# Patient Record
Sex: FEMALE | Race: AMERICAN INDIAN OR ALASKA NATIVE | NOT HISPANIC OR LATINO | Employment: UNEMPLOYED | URBAN - METROPOLITAN AREA
[De-identification: names, ages, dates, MRNs, and addresses within clinical notes are randomized per-mention and may not be internally consistent; named-entity substitution may affect disease eponyms.]

---

## 2017-01-04 ENCOUNTER — HOSPITAL ENCOUNTER (OUTPATIENT)
Dept: RADIOLOGY | Facility: MEDICAL CENTER | Age: 47
End: 2017-01-04
Attending: PHYSICIAN ASSISTANT
Payer: COMMERCIAL

## 2017-01-04 DIAGNOSIS — N92.0 EXCESSIVE OR FREQUENT MENSTRUATION: ICD-10-CM

## 2017-01-04 PROCEDURE — 76830 TRANSVAGINAL US NON-OB: CPT

## 2017-01-05 ENCOUNTER — HOSPITAL ENCOUNTER (OUTPATIENT)
Dept: LAB | Facility: MEDICAL CENTER | Age: 47
End: 2017-01-05
Attending: SPECIALIST
Payer: COMMERCIAL

## 2017-01-05 PROCEDURE — 88175 CYTOPATH C/V AUTO FLUID REDO: CPT

## 2017-01-05 PROCEDURE — 87491 CHLMYD TRACH DNA AMP PROBE: CPT

## 2017-01-05 PROCEDURE — 87624 HPV HI-RISK TYP POOLED RSLT: CPT

## 2017-01-05 PROCEDURE — 88305 TISSUE EXAM BY PATHOLOGIST: CPT

## 2017-01-05 PROCEDURE — 87591 N.GONORRHOEAE DNA AMP PROB: CPT

## 2017-01-06 LAB — PATHOLOGY CONSULT NOTE: NORMAL

## 2017-01-07 LAB
C TRACH DNA GENITAL QL NAA+PROBE: NEGATIVE
CYTOLOGY REG CYTOL: NORMAL
HPV HR 12 DNA CVX QL NAA+PROBE: NEGATIVE
HPV16 DNA SPEC QL NAA+PROBE: NEGATIVE
HPV18 DNA SPEC QL NAA+PROBE: NEGATIVE
N GONORRHOEA DNA GENITAL QL NAA+PROBE: NEGATIVE
SPECIMEN SOURCE: NORMAL
SPECIMEN SOURCE: NORMAL

## 2017-03-29 ENCOUNTER — HOSPITAL ENCOUNTER (OUTPATIENT)
Facility: MEDICAL CENTER | Age: 47
End: 2017-03-29
Attending: SPECIALIST | Admitting: SPECIALIST
Payer: COMMERCIAL

## 2017-04-05 NOTE — H&P
DATE OF SCHEDULED SURGERY:  2017    REASON FOR SURGERY:  Menometrorrhagia, dysmenorrhea, pelvic pain, symptomatic   pelvic floor relaxation, type 2 stress urinary incontinence.    HISTORY OF PRESENT ILLNESS:  This is a 47-year-old woman who was referred from   the [facility] after she had failed multiple oral contraceptive   regimen, continues to have menometrorrhagia and dysmenorrhea.  She states that   she is no longer interested in proceeding forward with any other conservative   hormonal management, other conservative surgical approach, is now interested   in proceeding forward with a diagnostic hysteroscopy, dilatation and   curettage, endometrial ablative procedure in addition to correction of her   symptomatic pelvic floor relaxation with an anterior and posterior vaginal   repair, enterocele repair, perineoplasty, transobturator tape midurethral   sling procedure and a probable sacrospinous wall suspension in addition to a   laparoscopic bilateral tubal fulguration procedure.  Risks, benefits,   alternatives of all individual procedures were addressed with the patient in   detail over several visits.  She has asked appropriate questions, signed the   appropriate consents and is wishing to proceed forward with surgery as   planned.    PAST MEDICAL HISTORY:  Migraine headaches, mixed urinary incontinence   symptoms, currently on oxybutynin for an overactive bladder component to her   urinary incontinence.    PAST SURGICAL HISTORY:  Nephrolithiasis, cholecystectomy.    OBSTETRICAL HISTORY:  Two previous deliveries in , , 1 therapeutic   .    GYNECOLOGIC HISTORY:  Patient reports completely irregular cycles with passage   of large clots, significant dysmenorrhea and pelvic pain.  She has a large   bulge at the vaginal introitus consistent with the leading edge of a   cystocele.  She has significant urinary incontinence with mixed urinary   incontinence component of systems.  She  denies any previous sexually   transmitted diseases or pelvic infections.    SOCIAL HISTORY:  She is .  She denies use of any alcohol, tobacco, or   recreational drug use.    MEDICATIONS:  Ibuprofen p.r.n., Maxalt 10 mg p.o. q. day.    ALLERGIES:  No known drug allergies.    PHYSICAL EXAMINATION:  VITAL SIGNS:  She is afebrile, hemodynamically stable.  HEART:  Regular rate and rhythm.  CHEST:  Clear to auscultation bilaterally.  ABDOMEN:  Soft, obese, nontender.  PELVIC:  Shows grade II anterior and posterior apical vaginal relaxation with   evidence of an enterocele on rectovaginal examination.  She is guaiac   negative.  EXTREMITIES:  Nontender.    She has undergone a workup including a negative urine pregnancy test.    Ultrasound demonstrated an inhomogeneous appearance of uterus in the   submucosal area, possibly representing a submucosal uterine leiomyoma versus   adenomyosis.  She has got ill-defined endometrium, although not abnormally   thickened.  She has undergone an endometrial biopsy, which demonstrated no   evidence of hyperplasia or malignancy.  Her Pap smear was within normal   limits.  Urodynamic studies did confirm type 2 stress urinary incontinence   with mixed urinary incontinence component on symptomatology.    ASSESSMENT AND PLAN:  A 47-year-old woman with menometrorrhagia, dysmenorrhea,   and pelvic pain, possibly related to symptomatic uterine fibroids versus   adenomyosis, failed oral contraceptive therapy.  Patient is currently   interested in proceeding forward with an endometrial ablative procedure with a   laparoscopic bilateral tubal fulguration; however, she has been extensively   counseled and is also considering proceeding forward with a hysterectomy   should any pathology be evaluated at the time of surgery in the form of a   laparoscopic-assisted vaginal hysterectomy and bilateral salpingectomy.  She   also wishes to proceed forward with correction of her symptomatic pelvic  floor   relaxation and anterior and posterior vaginal repair, enterocele repair,   perineoplasty, transobturator tape mid urethral sling procedure for possible   sacrospinous vault suspension.  Risks, benefits, alternatives of all   individual procedures have been addressed with the patient in detail over   several visits.  She is interested in proceeding forward with surgery as   planned.  She does understand that her pelvic pain and overactive bladder   symptoms may actually be unimproved her worsen with surgery as described   above; however, is interested in proceeding forward with the surgery    nonetheless.       ____________________________________     MD ELEAZAR ROJAS / JIN    DD:  04/05/2017 09:20:33  DT:  04/05/2017 09:41:21    D#:  [id number]  Job#:  [id number]

## 2019-10-17 ENCOUNTER — HOSPITAL ENCOUNTER (OUTPATIENT)
Dept: RADIOLOGY | Facility: MEDICAL CENTER | Age: 49
End: 2019-10-17
Attending: INTERNAL MEDICINE
Payer: COMMERCIAL

## 2019-10-17 DIAGNOSIS — S10.93XA CONTUSION OF FACE, SCALP AND NECK, INITIAL ENCOUNTER: ICD-10-CM

## 2019-10-17 DIAGNOSIS — S00.83XA CONTUSION OF FACE, SCALP AND NECK, INITIAL ENCOUNTER: ICD-10-CM

## 2019-10-17 DIAGNOSIS — S00.03XA CONTUSION OF FACE, SCALP AND NECK, INITIAL ENCOUNTER: ICD-10-CM

## 2019-10-17 PROCEDURE — 70450 CT HEAD/BRAIN W/O DYE: CPT

## 2020-01-22 ENCOUNTER — OFFICE VISIT (OUTPATIENT)
Dept: URGENT CARE | Facility: PHYSICIAN GROUP | Age: 50
End: 2020-01-22
Payer: COMMERCIAL

## 2020-01-22 VITALS
HEIGHT: 65 IN | OXYGEN SATURATION: 95 % | BODY MASS INDEX: 36.82 KG/M2 | RESPIRATION RATE: 15 BRPM | DIASTOLIC BLOOD PRESSURE: 90 MMHG | WEIGHT: 221 LBS | HEART RATE: 77 BPM | TEMPERATURE: 98.4 F | SYSTOLIC BLOOD PRESSURE: 144 MMHG

## 2020-01-22 DIAGNOSIS — H69.93 EUSTACHIAN TUBE DYSFUNCTION, BILATERAL: ICD-10-CM

## 2020-01-22 DIAGNOSIS — R42 VERTIGO: ICD-10-CM

## 2020-01-22 PROCEDURE — 99203 OFFICE O/P NEW LOW 30 MIN: CPT | Performed by: PHYSICIAN ASSISTANT

## 2020-01-22 RX ORDER — ALUMINUM HYDROXIDE, MAGNESIUM HYDROXIDE, SIMETHICONE 400; 400; 40 MG/10ML; MG/10ML; MG/10ML
SUSPENSION ORAL
COMMUNITY
Start: 2020-01-17 | End: 2023-04-06

## 2020-01-22 RX ORDER — AMOXICILLIN AND CLAVULANATE POTASSIUM 875; 125 MG/1; MG/1
1 TABLET, FILM COATED ORAL 2 TIMES DAILY
Qty: 14 TAB | Refills: 0 | Status: SHIPPED | OUTPATIENT
Start: 2020-01-22 | End: 2020-01-29

## 2020-01-22 RX ORDER — MECLIZINE HYDROCHLORIDE 25 MG/1
25 TABLET ORAL 3 TIMES DAILY PRN
COMMUNITY
End: 2020-01-22

## 2020-01-22 RX ORDER — CLOBETASOL PROPIONATE 0.5 MG/G
CREAM TOPICAL
COMMUNITY
Start: 2019-12-23

## 2020-01-22 RX ORDER — AZITHROMYCIN 250 MG/1
TABLET, FILM COATED ORAL
COMMUNITY
Start: 2020-01-21 | End: 2023-04-06

## 2020-01-22 RX ORDER — HYDROCORTISONE 1 G/100G
CREAM TOPICAL
COMMUNITY
Start: 2020-01-17 | End: 2023-04-06

## 2020-01-22 RX ORDER — IBUPROFEN 800 MG/1
TABLET ORAL
COMMUNITY
Start: 2019-12-23

## 2020-01-22 RX ORDER — PREDNISONE 10 MG/1
10 TABLET ORAL DAILY
COMMUNITY
End: 2023-04-06

## 2020-01-22 RX ORDER — SODIUM CHLORIDE 0.65 %
AEROSOL, SPRAY (ML) NASAL
COMMUNITY
Start: 2019-11-04 | End: 2023-04-06

## 2020-01-22 RX ORDER — DEXTROMETHORPHAN HBR, GUAIFENESIN 20; 200 MG/10ML; MG/10ML
SOLUTION ORAL
COMMUNITY
Start: 2020-01-17 | End: 2023-04-06

## 2020-01-22 RX ORDER — MECLIZINE HCL 25 MG/1
TABLET, CHEWABLE ORAL
COMMUNITY
Start: 2020-01-21

## 2020-01-22 RX ORDER — KETOTIFEN FUMARATE 0.25 MG/ML
SOLUTION/ DROPS OPHTHALMIC
COMMUNITY
Start: 2019-12-23

## 2020-01-22 ASSESSMENT — ENCOUNTER SYMPTOMS
VOMITING: 0
SHORTNESS OF BREATH: 0
CHILLS: 0
COUGH: 0
DIZZINESS: 1
SPUTUM PRODUCTION: 0
EYE DISCHARGE: 0
SORE THROAT: 0
PALPITATIONS: 0
MYALGIAS: 0
EYE REDNESS: 0
FEVER: 0
DIARRHEA: 0
ABDOMINAL PAIN: 0
NAUSEA: 0
HEADACHES: 1
WHEEZING: 0
CONSTIPATION: 0

## 2020-01-23 NOTE — PROGRESS NOTES
Subjective:      Radha Dawkins is a 49 y.o. female who presents with Dizziness (onset sunday, sick before that, now having ear and jaw pain)    HPI:  This is a new problem. Radha Dawkins is a 49 y.o. female who presents today for her to go.  Patient has been being evaluated by her PCP since Friday of last week.  She initially went in for complaints of cough and nasal congestion at that time had 1 episode of vertigo.  She was prescribed azithromycin as well as meclizine.  She also reports that her migraines have been acting up of the past few days.  She has a history of migraines and his headaches are presenting the same.  She normally takes Maxalt for the symptoms.  Last time she took a dose of Maxalt was 3 days ago.  She states she took a few days of the azithromycin and then stopped taking it she was not sure if it was can interact with any of her other medications.  She says that she still has intermittent migraines over the past few days, though she has not taken any Maxalt since then.  She still having symptoms of vertigo, however.  She came in to see if there is anything different we can do for the vertigo.  She denies any visual changes, lightheadedness, chest pain, shortness of breath.  At the last minute patient also notes that her PCP started her on prednisone yesterday.      Review of Systems   Constitutional: Positive for malaise/fatigue. Negative for chills and fever.   HENT: Positive for congestion. Negative for sore throat.         Plugged ear sensation bilaterally   Eyes: Negative for discharge and redness.   Respiratory: Negative for cough, sputum production, shortness of breath and wheezing.    Cardiovascular: Negative for chest pain and palpitations.   Gastrointestinal: Negative for abdominal pain, constipation, diarrhea, nausea and vomiting.   Musculoskeletal: Negative for myalgias.   Skin: Negative for rash.   Neurological: Positive for dizziness and headaches.       PMH:  has no past medical  "history on file.  MEDS:   Current Outpatient Medications:   •  meclizine (ANTIVERT) 25 MG Tab, Take 25 mg by mouth 3 times a day as needed., Disp: , Rfl:   •  Diclofenac Sodium 1 % Gel, , Disp: , Rfl:   •  azithromycin (ZITHROMAX) 250 MG Tab, , Disp: , Rfl:   •  PROAIR  (90 Base) MCG/ACT Aero Soln inhalation aerosol, , Disp: , Rfl:   •  ibuprofen (MOTRIN) 800 MG Tab, , Disp: , Rfl:   •  Rizatriptan Benzoate (MAXALT-MLT PO), Take  by mouth., Disp: , Rfl:   •  predniSONE (DELTASONE) 10 MG Tab, Take 10 mg by mouth every day., Disp: , Rfl:   •  amoxicillin-clavulanate (AUGMENTIN) 875-125 MG Tab, Take 1 Tab by mouth 2 times a day for 7 days., Disp: 14 Tab, Rfl: 0  ALLERGIES: Not on File  SURGHX: No past surgical history on file.  SOCHX:  reports that she has never smoked. She has never used smokeless tobacco.  FH: Family history was reviewed, no pertinent findings to report     Objective:     /90   Pulse 77   Temp 36.9 °C (98.4 °F)   Resp 15   Ht 1.651 m (5' 5\")   Wt 100.2 kg (221 lb)   SpO2 95%   BMI 36.78 kg/m²      Physical Exam  Constitutional:       Appearance: She is well-developed.   HENT:      Head: Normocephalic and atraumatic.      Right Ear: Ear canal and external ear normal. Tympanic membrane is erythematous and bulging.      Left Ear: Ear canal and external ear normal. Tympanic membrane is erythematous and bulging.      Nose: Mucosal edema and congestion present. No rhinorrhea.      Right Sinus: No maxillary sinus tenderness or frontal sinus tenderness.      Left Sinus: No maxillary sinus tenderness or frontal sinus tenderness.      Mouth/Throat:      Lips: Pink.      Mouth: Mucous membranes are moist.      Pharynx: Oropharynx is clear.   Eyes:      Conjunctiva/sclera: Conjunctivae normal.      Pupils: Pupils are equal, round, and reactive to light.   Neck:      Musculoskeletal: Normal range of motion.   Cardiovascular:      Rate and Rhythm: Normal rate and regular rhythm.      Heart " sounds: Normal heart sounds. No murmur.   Pulmonary:      Effort: Pulmonary effort is normal.      Breath sounds: Normal breath sounds. No wheezing.   Lymphadenopathy:      Cervical: No cervical adenopathy.   Skin:     General: Skin is warm and dry.      Capillary Refill: Capillary refill takes less than 2 seconds.   Neurological:      Mental Status: She is alert and oriented to person, place, and time.   Psychiatric:         Behavior: Behavior normal.         Judgment: Judgment normal.            Assessment/Plan:       1. Eustachian tube dysfunction, bilateral  - amoxicillin-clavulanate (AUGMENTIN) 875-125 MG Tab; Take 1 Tab by mouth 2 times a day for 7 days.  Dispense: 14 Tab; Refill: 0    2. Vertigo  -Patient was given information on the modified Epley maneuver.  She can continue her meclizine prescription as needed.  ER precautions discussed.      *Continue with other prescriptions as prescribed by PCP.  Follow-up with them if no resolution after 3 days.  Strict ER precautions discussed.

## 2020-06-19 ENCOUNTER — OFFICE VISIT (OUTPATIENT)
Dept: URGENT CARE | Facility: PHYSICIAN GROUP | Age: 50
End: 2020-06-19
Payer: COMMERCIAL

## 2020-06-19 ENCOUNTER — HOSPITAL ENCOUNTER (OUTPATIENT)
Dept: RADIOLOGY | Facility: MEDICAL CENTER | Age: 50
End: 2020-06-19
Attending: FAMILY MEDICINE
Payer: COMMERCIAL

## 2020-06-19 VITALS
SYSTOLIC BLOOD PRESSURE: 162 MMHG | HEIGHT: 65 IN | HEART RATE: 78 BPM | RESPIRATION RATE: 16 BRPM | BODY MASS INDEX: 38.65 KG/M2 | WEIGHT: 232 LBS | OXYGEN SATURATION: 98 % | DIASTOLIC BLOOD PRESSURE: 102 MMHG | TEMPERATURE: 97.1 F

## 2020-06-19 DIAGNOSIS — M79.605 LEFT LEG PAIN: ICD-10-CM

## 2020-06-19 DIAGNOSIS — S87.82XA LOWER LEG CRUSH INJURY, LEFT, INITIAL ENCOUNTER: ICD-10-CM

## 2020-06-19 DIAGNOSIS — R03.0 ELEVATED BLOOD PRESSURE READING: ICD-10-CM

## 2020-06-19 PROCEDURE — 73590 X-RAY EXAM OF LOWER LEG: CPT | Mod: LT

## 2020-06-19 PROCEDURE — 93971 EXTREMITY STUDY: CPT | Mod: LT

## 2020-06-19 PROCEDURE — 99204 OFFICE O/P NEW MOD 45 MIN: CPT | Performed by: FAMILY MEDICINE

## 2020-06-19 ASSESSMENT — ENCOUNTER SYMPTOMS
MUSCLE WEAKNESS: 0
FEVER: 0
SHORTNESS OF BREATH: 0
NUMBNESS: 1
COUGH: 0
MYALGIAS: 0
LOSS OF MOTION: 0
CHILLS: 0
SORE THROAT: 0
DIZZINESS: 0
VOMITING: 0
NAUSEA: 0
INABILITY TO BEAR WEIGHT: 0

## 2020-06-19 ASSESSMENT — PAIN SCALES - GENERAL: PAINLEVEL: 4=SLIGHT-MODERATE PAIN

## 2020-06-19 NOTE — PATIENT INSTRUCTIONS
Contusion  Introduction  A contusion is a deep bruise. Contusions happen when an injury causes bleeding under the skin. Symptoms of bruising include pain, swelling, and discolored skin. The skin may turn blue, purple, or yellow.  Follow these instructions at home:  · Rest the injured area.  · If told, put ice on the injured area.  ¨ Put ice in a plastic bag.  ¨ Place a towel between your skin and the bag.  ¨ Leave the ice on for 20 minutes, 2-3 times per day.  · If told, put light pressure (compression) on the injured area using an elastic bandage. Make sure the bandage is not too tight. Remove it and put it back on as told by your doctor.  · If possible, raise (elevate) the injured area above the level of your heart while you are sitting or lying down.  · Take over-the-counter and prescription medicines only as told by your doctor.  Contact a doctor if:  · Your symptoms do not get better after several days of treatment.  · Your symptoms get worse.  · You have trouble moving the injured area.  Get help right away if:  · You have very bad pain.  · You have a loss of feeling (numbness) in a hand or foot.  · Your hand or foot turns pale or cold.  This information is not intended to replace advice given to you by your health care provider. Make sure you discuss any questions you have with your health care provider.  Document Released: 06/05/2009 Document Revised: 05/25/2017 Document Reviewed: 05/04/2016  © 2017 Elsevier    Hypertension  Hypertension is another name for high blood pressure. High blood pressure forces your heart to work harder to pump blood. A blood pressure reading has two numbers, which includes a higher number over a lower number (example: 110/72).  Follow these instructions at home:  · Have your blood pressure rechecked by your doctor.  · Only take medicine as told by your doctor. Follow the directions carefully. The medicine does not work as well if you skip doses. Skipping doses also puts you at  risk for problems.  · Do not smoke.  · Monitor your blood pressure at home as told by your doctor.  Contact a doctor if:  · You think you are having a reaction to the medicine you are taking.  · You have repeat headaches or feel dizzy.  · You have puffiness (swelling) in your ankles.  · You have trouble with your vision.  Get help right away if:  · You get a very bad headache and are confused.  · You feel weak, numb, or faint.  · You get chest or belly (abdominal) pain.  · You throw up (vomit).  · You cannot breathe very well.  This information is not intended to replace advice given to you by your health care provider. Make sure you discuss any questions you have with your health care provider.  Document Released: 06/05/2009 Document Revised: 05/25/2017 Document Reviewed: 10/10/2014  [employer] Interactive Patient Education © 2017 [employer] Inc.
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
## 2020-07-11 ENCOUNTER — HOSPITAL ENCOUNTER (OUTPATIENT)
Dept: RADIOLOGY | Facility: MEDICAL CENTER | Age: 50
End: 2020-07-11
Attending: NURSE PRACTITIONER
Payer: COMMERCIAL

## 2020-07-11 ENCOUNTER — OFFICE VISIT (OUTPATIENT)
Dept: URGENT CARE | Facility: PHYSICIAN GROUP | Age: 50
End: 2020-07-11
Payer: COMMERCIAL

## 2020-07-11 VITALS
SYSTOLIC BLOOD PRESSURE: 162 MMHG | BODY MASS INDEX: 37.99 KG/M2 | RESPIRATION RATE: 16 BRPM | OXYGEN SATURATION: 98 % | WEIGHT: 228 LBS | DIASTOLIC BLOOD PRESSURE: 108 MMHG | HEIGHT: 65 IN | TEMPERATURE: 98 F | HEART RATE: 97 BPM

## 2020-07-11 DIAGNOSIS — M25.462 PAIN AND SWELLING OF LEFT KNEE: ICD-10-CM

## 2020-07-11 DIAGNOSIS — S89.92XA INJURY OF LEFT KNEE, INITIAL ENCOUNTER: ICD-10-CM

## 2020-07-11 DIAGNOSIS — M25.462 EFFUSION OF LEFT KNEE: ICD-10-CM

## 2020-07-11 DIAGNOSIS — M25.562 PAIN AND SWELLING OF LEFT KNEE: ICD-10-CM

## 2020-07-11 PROCEDURE — 99214 OFFICE O/P EST MOD 30 MIN: CPT | Performed by: NURSE PRACTITIONER

## 2020-07-11 PROCEDURE — 73562 X-RAY EXAM OF KNEE 3: CPT | Mod: LT

## 2020-07-11 RX ORDER — METHYLPREDNISOLONE 4 MG/1
TABLET ORAL
Qty: 1 PACKAGE | Refills: 0 | Status: SHIPPED | OUTPATIENT
Start: 2020-07-11 | End: 2023-04-06

## 2020-07-11 ASSESSMENT — ENCOUNTER SYMPTOMS
SENSORY CHANGE: 0
NEUROLOGICAL NEGATIVE: 1
WEAKNESS: 0
CONSTITUTIONAL NEGATIVE: 1

## 2020-07-11 ASSESSMENT — VISUAL ACUITY: OU: 1

## 2020-07-11 NOTE — PROGRESS NOTES
Subjective:     Radha Dawkins is a 50 y.o. female who presents for Knee Pain (L knee pain, swelling, came to  x2 weeks ago)       Knee Pain   This is a new problem. The problem has been unchanged. Pertinent negatives include no weakness.     Patient presents to urgent care today with reports of left knee swelling and medial tenderness.  Patient was seen in urgent care on 6/19/2020 for evaluation of a left lower leg injury after striking the front of her left lower leg against a rock while she was biking about 1 month prior to that.  An x-ray of her left lower leg and an ultrasound came back negative for acute process.  Patient has been using ibuprofen.  Reports that her left lower leg bruising has been improving.  However, her left knee swelling has not been improving.    Patient was screened prior to rooming and denied COVID-19 diagnosis or contact with a person who has been diagnosed or is suspected to have COVID-19. During this visit, appropriate PPE was worn, hand hygiene was performed, and the patient and any visitors were masked.     PMH:  has no past medical history on file.    MEDS:   Current Outpatient Medications:   •  methylPREDNISolone (MEDROL DOSEPAK) 4 MG Tablet Therapy Pack, Use as directed on package. May take all of Day 1 as a single dose (24 mg) when starting., Disp: 1 Package, Rfl: 0  •  Diclofenac Sodium 1 % Gel, , Disp: , Rfl:   •  azithromycin (ZITHROMAX) 250 MG Tab, , Disp: , Rfl:   •  ibuprofen (MOTRIN) 800 MG Tab, , Disp: , Rfl:   •  Rizatriptan Benzoate (MAXALT-MLT PO), Take  by mouth., Disp: , Rfl:   •  predniSONE (DELTASONE) 10 MG Tab, Take 10 mg by mouth every day., Disp: , Rfl:   •  ALMACONE 200-200-20 MG/5ML Suspension, , Disp: , Rfl:   •  DEEP SEA NASAL SPRAY 0.65 % Solution, , Disp: , Rfl:   •  ketotifen (ZADITOR) 0.025 % ophthalmic solution, , Disp: , Rfl:   •  TRAVEL SICKNESS 25 MG Chew Tab, , Disp: , Rfl:   •  clobetasol (TEMOVATE) 0.05 % Cream, , Disp: , Rfl:   •  tretinoin  "(RETIN-A) 0.025 % cream, , Disp: , Rfl:   •  ROBAFEN DM COUGH  MG/5ML Liquid soln, , Disp: , Rfl:   •  SORE THROAT SPRAY 1.4 % Liquid, , Disp: , Rfl:     ALLERGIES: No Known Allergies    SURGHX: History reviewed. No pertinent surgical history.    SOCHX:  reports that she has never smoked. She has never used smokeless tobacco. She reports previous alcohol use.     FH: Reviewed with patient, not pertinent to this visit.    Review of Systems   Constitutional: Negative.    Musculoskeletal:        Left knee pain, swelling   Skin: Negative.    Neurological: Negative.  Negative for sensory change and weakness.   All other systems reviewed and are negative.    Additional details per HPI.      Objective:     BP (!) 162/108   Pulse 97   Temp 36.7 °C (98 °F) (Temporal)   Resp 16   Ht 1.651 m (5' 5\")   Wt 103.4 kg (228 lb)   SpO2 98%   BMI 37.94 kg/m²     Physical Exam  Vitals signs reviewed.   Constitutional:       General: She is not in acute distress.     Appearance: She is well-developed. She is not ill-appearing or toxic-appearing.   HENT:      Head: Normocephalic.      Right Ear: External ear normal.      Left Ear: External ear normal.      Nose: Nose normal.   Eyes:      General: Vision grossly intact.      Extraocular Movements: Extraocular movements intact.      Conjunctiva/sclera: Conjunctivae normal.   Neck:      Musculoskeletal: Normal range of motion.   Cardiovascular:      Rate and Rhythm: Normal rate.   Pulmonary:      Effort: Pulmonary effort is normal. No respiratory distress.   Musculoskeletal: Normal range of motion.         General: No deformity.      Left knee: She exhibits swelling. She exhibits normal range of motion, no ecchymosis, no deformity, no laceration, normal alignment, no LCL laxity, normal patellar mobility and no MCL laxity. Tenderness found. Medial joint line tenderness noted.      Comments: Negative drawer tests   Skin:     General: Skin is warm and dry.      Coloration: Skin is " not pale.   Neurological:      Mental Status: She is alert and oriented to person, place, and time.      Sensory: No sensory deficit.      Motor: No weakness.      Coordination: Coordination normal.      Gait: Gait normal.   Psychiatric:         Behavior: Behavior normal. Behavior is cooperative.     X-ray of left knee:    Details     Reading Physician  Reading Date  Result Priority    Devin Calixto M.D.  [phone]  7/11/2020        Narrative & Impression         7/11/2020 1:14 PM     HISTORY/REASON FOR EXAM:  The left knee pain. Injured 2 weeks ago.     TECHNIQUE/EXAM DESCRIPTION AND NUMBER OF VIEWS: 3 views of the LEFT knee.     COMPARISON: None     FINDINGS:  Bone density is normal. There is no evidence of fracture or dislocation. There is no evidence of arthropathy. There is a moderate sized joint effusion.     IMPRESSION:     No evidence of acute fracture or dislocation.             Last Resulted: 07/11/20  1:19 PM           Assessment/Plan:     1. Effusion of left knee    2. Pain and swelling of left knee  - DX-KNEE 3 VIEWS LEFT; Future  - methylPREDNISolone (MEDROL DOSEPAK) 4 MG Tablet Therapy Pack; Use as directed on package. May take all of Day 1 as a single dose (24 mg) when starting.  Dispense: 1 Package; Refill: 0  - REFERRAL TO SPORTS MEDICINE    3. Injury of left knee, initial encounter      X-ray of left knee ordered. Radiology report and images reviewed by myself. Moderate joint effusion.    Rest, ice, compress, elevate, acetaminophen. Elastic bandage applied. Advised to avoid NSAIDs while on steroid therapy. Sports medicine evaluation and treatment.    Differential diagnosis, natural history, supportive care, over-the-counter symptom management per 's instructions, close monitoring, and indications for immediate follow-up discussed.     Patient advised to: Return for 1) Symptoms that worsen/don't improve, or go to ER, 2) Follow up with primary care in 7-10 days.    All questions  answered. Patient agrees with the plan of care.    Discharge summary provided.

## 2020-07-11 NOTE — PATIENT INSTRUCTIONS
A referral request has been placed for sports medicine. We have a referrals department that is tasked with locating a suitable office that currently accepts patients and your insurance and you should be receiving referral information from them such as the office name, address, and number. This process usually takes around 3 business days. If you are not contacted by our referrals department, please call [phone].       Rest, ice, compress, elevate, acetaminophen.      Knee Effusion  Knee effusion refers to excess fluid in the knee joint. This can cause pain and swelling in your knee. Knee effusion creates more pressure than usual in your knee joint. This makes it more difficult for you to bend and move your knee. If there is fluid in your knee, it often means that something is wrong inside your knee. This can be a result of:  · Severe arthritis.  · Injury to the knee muscles, ligaments, or cartilage.  · Infection.  · Autoimmune disease. This means that your body's defense system (immune system) mistakenly attacks healthy body tissues.  Follow these instructions at home:  Medicines  · Take over-the-counter and prescription medicines only as told by your health care provider.  · Do not drive or use heavy machinery while taking prescription pain medicine.  · If you are taking prescription pain medicine, take actions to prevent or treat constipation. Your health care provider may recommend that you:  ? Drink enough fluid to keep your urine pale yellow.  ? Eat foods that are high in fiber, such as fresh fruits and vegetables, whole grains, and beans.  ? Limit foods that are high in fat and processed sugars, such as fried or sweet foods.  ? Take an over-the-counter or prescription medicine for constipation.  If you have a brace:  · Wear the brace as told by your health care provider. Remove it only as told by your health care provider.  · Loosen the brace if your toes tingle, become numb, or turn cold and  blue.  · Keep the brace clean.  · If the brace is not waterproof:  ? Do not let it get wet.  ? Cover it with a watertight covering when you take a bath or a shower.  Managing pain, stiffness, and swelling    · If directed, put ice on the swollen area:  ? If you have a removable brace, remove it as told by your health care provider.  ? Put ice in a plastic bag.  ? Place a towel between your skin and the bag.  ? Leave the ice on for 20 minutes, 2-3 times per day.  · Raise (elevate) your knee at or above the level of your heart while you are sitting or lying down.  General instructions  · Do not use any products that contain nicotine or tobacco, such as cigarettes and e-cigarettes. These can delay healing. If you need help quitting, ask your health care provider.  · Do not use the injured limb to support your body weight until your health care provider says it is okay. Use crutches as told by your health care provider.  · Do any rehabilitation or strengthening exercises as told by your health care provider.  · Rest as told by your health care provider.  ? Avoid sitting for a long time without moving. Get up to take short walks every 1-2 hours. This is important to improve blood flow and breathing. Ask for help if you feel weak or unsteady.  · Keep all follow-up visits as told by your health care provider. This is important.  Contact a health care provider if you:  · Continue to have pain in your knee.  Get help right away if you:  · Have swelling or redness of your knee that gets worse or does not get better.  · Have severe pain in your knee.  · Have a fever.  Summary  · Knee effusion refers to excess fluid in the knee joint. This causes pain and swelling and makes it difficult to bend and move your knee.  · Effusion may be caused by severe arthritis, autoimmune disease, infection, or injury to the knee muscles, ligaments, or cartilage.  · Take over-the-counter and prescription medicines only as told by your health  care provider.  · If you have a brace, wear the brace as told by your health care provider.  This information is not intended to replace advice given to you by your health care provider. Make sure you discuss any questions you have with your health care provider.  Document Released: 03/09/2005 Document Revised: 09/06/2019 Document Reviewed: 12/30/2018  Elsevier Patient Education © 2020 [employer]

## 2021-02-23 ENCOUNTER — APPOINTMENT (OUTPATIENT)
Dept: RADIOLOGY | Facility: MEDICAL CENTER | Age: 51
End: 2021-02-23
Attending: UROLOGY
Payer: COMMERCIAL

## 2021-03-04 ENCOUNTER — HOSPITAL ENCOUNTER (OUTPATIENT)
Dept: RADIOLOGY | Facility: MEDICAL CENTER | Age: 51
End: 2021-03-04
Attending: UROLOGY
Payer: COMMERCIAL

## 2021-03-04 DIAGNOSIS — N20.1 CALCULUS OF URETER: ICD-10-CM

## 2021-03-04 PROCEDURE — 74018 RADEX ABDOMEN 1 VIEW: CPT

## 2021-03-04 PROCEDURE — 76775 US EXAM ABDO BACK WALL LIM: CPT

## 2022-08-31 ENCOUNTER — HOSPITAL ENCOUNTER (OUTPATIENT)
Dept: RADIOLOGY | Facility: MEDICAL CENTER | Age: 52
End: 2022-08-31
Attending: INTERNAL MEDICINE
Payer: COMMERCIAL

## 2022-08-31 DIAGNOSIS — Z12.31 ENCOUNTER FOR SCREENING MAMMOGRAM FOR MALIGNANT NEOPLASM OF BREAST: ICD-10-CM

## 2022-08-31 PROCEDURE — 77063 BREAST TOMOSYNTHESIS BI: CPT

## 2023-04-06 ENCOUNTER — OFFICE VISIT (OUTPATIENT)
Dept: URGENT CARE | Facility: PHYSICIAN GROUP | Age: 53
End: 2023-04-06

## 2023-04-06 VITALS
WEIGHT: 220 LBS | DIASTOLIC BLOOD PRESSURE: 84 MMHG | OXYGEN SATURATION: 98 % | BODY MASS INDEX: 36.65 KG/M2 | HEART RATE: 70 BPM | HEIGHT: 65 IN | RESPIRATION RATE: 12 BRPM | TEMPERATURE: 97.8 F | SYSTOLIC BLOOD PRESSURE: 132 MMHG

## 2023-04-06 DIAGNOSIS — R21 RASH: ICD-10-CM

## 2023-04-06 PROCEDURE — 99213 OFFICE O/P EST LOW 20 MIN: CPT | Performed by: REGISTERED NURSE

## 2023-04-06 RX ORDER — TRIAMCINOLONE ACETONIDE 1 MG/G
1 OINTMENT TOPICAL 2 TIMES DAILY
Qty: 30 G | Refills: 1 | Status: SHIPPED | OUTPATIENT
Start: 2023-04-06 | End: 2023-04-13

## 2023-04-06 NOTE — PROGRESS NOTES
"Chief Complaint   Patient presents with    Bump     Getting \"welts\" on arms, started on R arm x1 week ago and went away; now on L arm. One was on R food but it also went away. They itch.      HPI:   Patient is a 53 y.o. female who is presenting for a few red itchy bumps that have been appeared on the right arm then resolved and a few days ago developed 2 on the left forearm. The ones on right arm were present when she woke up, are itchy and then resolved after 6-7 days.  Same course with the 2 on left forearm.  Reports using Benadryl which did help with the itching.  Does take a daily Allegra.  Denies underlying skin conditions or environmental or food allergies.  No new medications or change in daily activities.  No in household has similar symptoms.  Pertinent negatives include no chest pain, shortness of breath, throat swelling, recent illness, fever    No Known Allergies     Current Outpatient Medications Ordered in Epic   Medication Sig Dispense Refill    Fexofenadine-Pseudoephedrine (ALLEGRA-D 12 HOUR PO) Take  by mouth.      triamcinolone acetonide (KENALOG) 0.1 % Ointment Apply 1 Application. topically 2 times a day for 7 days. 30 g 1    Diclofenac Sodium 1 % Gel       ibuprofen (MOTRIN) 800 MG Tab       Rizatriptan Benzoate (MAXALT-MLT PO) Take  by mouth.      ketotifen (ZADITOR) 0.025 % ophthalmic solution       TRAVEL SICKNESS 25 MG Chew Tab       clobetasol (TEMOVATE) 0.05 % Cream       tretinoin (RETIN-A) 0.025 % cream        No current Epic-ordered facility-administered medications on file.     Pertinent history: none    Social History     Tobacco Use    Smoking status: Never    Smokeless tobacco: Never   Vaping Use    Vaping Use: Never used   Substance Use Topics    Alcohol use: Yes     Comment: occ    Drug use: Never     ROS : Negative unless noted in HPI    Vitals:    04/06/23 1257   BP: 132/84   Pulse: 70   Resp: 12   Temp: 36.6 °C (97.8 °F)   SpO2: 98%       Physical Exam  Vitals and nursing note " reviewed.   Constitutional:       Appearance: Normal appearance.   HENT:      Head: Normocephalic.      Comments: No angioedema     Nose: Nose normal.      Mouth/Throat:      Mouth: Mucous membranes are moist.      Pharynx: Oropharynx is clear.   Eyes:      Pupils: Pupils are equal, round, and reactive to light.   Neck:      Comments: No stridor  Cardiovascular:      Rate and Rhythm: Normal rate and regular rhythm.   Pulmonary:      Effort: Pulmonary effort is normal. No respiratory distress.      Breath sounds: Normal breath sounds.   Skin:     General: Skin is warm and dry.      Findings: Rash present. No bruising, ecchymosis or signs of injury. Rash is urticarial (Does appear to be a tiny centralized bite-like awilda). Rash is not crusting, papular, purpuric, pustular or vesicular.             Comments: No areas of fluctuance, no streaking up arm   Neurological:      Mental Status: She is alert.     Assessment/Plan:  1. Rash  triamcinolone acetonide (KENALOG) 0.1 % Ointment      Pleasant 53-year-old female.  Vital signs are stable.  2-3 hive-like lesions on right arm resolved after 7 days, they were pruritic, nonpustular, nonvesicular.  Now has 2 on her left forearm that are following a similar course.  There is a tiny indentation in the central hive that could possibly be a bite.  No lesions elsewhere on body identified.  No signs of airway compromise.  No change in daily habits or lifestyle or diet or medications.  Discussed using Benadryl and Allegra, will provide topical steroid.  Monitor symptoms with follow-up to primary care as well as potential allergy and dermatology.    Return to clinic or go to ED if symptoms worsen or persist. Indications for ED discussed at length. Patient/Parent/Guardian voices understanding. Follow-up with your primary care provider in 3-5 days. Red flag symptoms discussed. All side effects of medication discussed including allergic response, GI upset, tendon injury, rash, sedation  etc.    Please note that this dictation was created using voice recognition software. I have made every reasonable attempt to correct obvious errors, but I expect that there are errors of grammar and possibly content that I did not discover before finalizing the note.